# Patient Record
Sex: MALE | Employment: OTHER
[De-identification: names, ages, dates, MRNs, and addresses within clinical notes are randomized per-mention and may not be internally consistent; named-entity substitution may affect disease eponyms.]

---

## 2022-07-30 ENCOUNTER — TELEPHONE ENCOUNTER (OUTPATIENT)
Age: 77
End: 2022-07-30

## 2022-07-30 RX ORDER — OMEPRAZOLE 20 MG/1
1 (ONE) TABLET, DELAYED RELEASE ORAL DAILY
Qty: 0 | Refills: 2 | OUTPATIENT
Start: 2016-02-03 | End: 2016-11-08

## 2022-07-31 ENCOUNTER — TELEPHONE ENCOUNTER (OUTPATIENT)
Age: 77
End: 2022-07-31

## 2022-07-31 RX ORDER — OMEPRAZOLE 40 MG/1
1 (ONE) CAPSULE, DELAYED RELEASE ORAL
Qty: 0 | Refills: 4 | Status: ACTIVE | COMMUNITY
Start: 2016-11-08

## 2022-07-31 RX ORDER — SUCRALFATE 1 G/1
1 (ONE) TABLET ORAL
Qty: 0 | Refills: 4 | Status: ACTIVE | COMMUNITY
Start: 2017-04-06

## 2022-07-31 RX ORDER — OMEPRAZOLE 40 MG/1
1 (ONE) CAPSULE, DELAYED RELEASE ORAL
Qty: 0 | Refills: 2 | Status: ACTIVE | COMMUNITY
Start: 2017-02-01

## 2022-07-31 RX ORDER — OMEPRAZOLE 20 MG/1
1 (ONE) TABLET, DELAYED RELEASE ORAL DAILY
Qty: 0 | Refills: 2 | Status: ACTIVE | COMMUNITY
Start: 2015-03-17

## 2022-07-31 RX ORDER — OMEPRAZOLE 20 MG/1
1 (ONE) TABLET, DELAYED RELEASE ORAL DAILY
Qty: 0 | Refills: 2 | Status: ACTIVE | COMMUNITY
Start: 2014-05-05

## 2022-07-31 RX ORDER — OMEPRAZOLE 20 MG/1
1 (ONE) TABLET, DELAYED RELEASE ORAL DAILY
Qty: 0 | Refills: 16 | Status: ACTIVE | COMMUNITY
Start: 2014-04-21

## 2022-07-31 RX ORDER — OMEPRAZOLE 40 MG/1
1 (ONE) CAPSULE DR CAPSULE, DELAYED RELEASE ORAL
Qty: 0 | Refills: 2 | Status: ACTIVE | COMMUNITY
Start: 2017-03-17

## 2022-07-31 RX ORDER — ACETAMINOPHEN 160 MG/5ML
10 LIQUID ORAL
Qty: 0 | Refills: 4 | Status: ACTIVE | COMMUNITY
Start: 2017-04-06

## 2022-07-31 RX ORDER — OMEPRAZOLE 40 MG/1
1 (ONE) CAPSULE DR CAPSULE, DELAYED RELEASE ORAL
Qty: 0 | Refills: 2 | Status: ACTIVE | COMMUNITY
Start: 2017-02-22

## 2022-07-31 RX ORDER — FAMOTIDINE 10 MG
1 (ONE) TABLET ORAL
Qty: 0 | Refills: 4 | Status: ACTIVE | COMMUNITY
Start: 2017-04-06

## 2022-07-31 RX ORDER — OMEPRAZOLE 20 MG/1
1 (ONE) TABLET, DELAYED RELEASE ORAL DAILY
Qty: 0 | Refills: 2 | Status: ACTIVE | COMMUNITY
Start: 2016-02-03

## 2024-04-25 ENCOUNTER — OV NP (OUTPATIENT)
Dept: URBAN - METROPOLITAN AREA CLINIC 60 | Facility: CLINIC | Age: 79
End: 2024-04-25
Payer: MEDICARE

## 2024-04-25 VITALS
HEART RATE: 62 BPM | BODY MASS INDEX: 27.5 KG/M2 | HEIGHT: 72 IN | SYSTOLIC BLOOD PRESSURE: 138 MMHG | OXYGEN SATURATION: 98 % | TEMPERATURE: 98.6 F | WEIGHT: 203 LBS | DIASTOLIC BLOOD PRESSURE: 86 MMHG

## 2024-04-25 DIAGNOSIS — K21.9 CHRONIC GERD: ICD-10-CM

## 2024-04-25 DIAGNOSIS — K22.710 BARRETT'S ESOPHAGUS WITH LOW GRADE DYSPLASIA: ICD-10-CM

## 2024-04-25 DIAGNOSIS — Z53.20 COLONOSCOPY REFUSED: ICD-10-CM

## 2024-04-25 PROCEDURE — 99204 OFFICE O/P NEW MOD 45 MIN: CPT

## 2024-04-25 RX ORDER — LANCETS 28 GAUGE
EACH MISCELLANEOUS
Qty: 100 UNSPECIFIED | Status: ACTIVE | COMMUNITY

## 2024-04-25 RX ORDER — METFORMIN HYDROCHLORIDE 500 MG/1
TAKE 1 TABLET BY MOUTH TWICE DAILY WITH MEALS TABLET ORAL
Qty: 180 EACH | Refills: 1 | Status: ACTIVE | COMMUNITY

## 2024-04-25 RX ORDER — MAGNESIUM HYDROXIDE 2400 MG/10ML
5 ML SUSPENSION ORAL TWICE A DAY
Status: ACTIVE | COMMUNITY

## 2024-04-25 RX ORDER — PANTOPRAZOLE SODIUM 40 MG/1
1 TABLET TABLET, DELAYED RELEASE ORAL ONCE A DAY
Status: ACTIVE | COMMUNITY

## 2024-04-25 RX ORDER — LATANOPROST 50 UG/ML
SOLUTION OPHTHALMIC
Qty: 7 UNSPECIFIED | Status: ACTIVE | COMMUNITY

## 2024-04-25 RX ORDER — DOCUSATE SODIUM 100 MG
1 CAPSULE AS NEEDED CAPSULE ORAL ONCE A DAY
Status: ACTIVE | COMMUNITY

## 2024-04-25 RX ORDER — SUCRALFATE 1 G/1
TABLET ORAL
Qty: 360 TABLET | Status: ACTIVE | COMMUNITY

## 2024-04-25 NOTE — HPI-TODAY'S VISIT:
Don is a 78-year-old male presenting to the office today for evaluation of history of Reardon's esophagus, history of esophagitis, GERD.  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  He was following with [facility] here in 2017 and had multiple ablations completed and ended up with an esophageal perforation after ablation and was in the ICU for 5 weeks.  Then, he has been following with [facility] since 2017.  Apparently his most recent EGD with [facility] showed no more Reardon's esophagus.  We do not have the most recent EGD but we do have an EGD from 2022 and results are discussed in detail below.  He states that in March he was in [place] and stopped taking his medication including sucralfate and pantoprazole and was having a lot of spicy food and this caused a bout of esophagitis and dysphagia.  He did have EGD completed there that showed esophagitis but Reardon's esophagus could not be adequately examined.  He states since starting back on his pantoprazole and super fate he has been under very good control and has had minimal to no reflux at all.  He also states that his most recent colonoscopy was 1.5-2 years ago and he did not have satisfactory bowel preparation but apparently no polyps were found.  He has no other GI complaints, questions, concerns at this time.  He denies melena, hematochezia, bright red blood per rectum, nausea/vomiting, hematemesis, dysphagia, abdominal pain, constipation/diarrhea/change in bowel habits, change in stool caliber, unintentional weight loss/weight gain, no longer having reflux. . EGD 2/13/2024 completed by Dr. Marko Richards at [facility] in [place];- Z-line irregular, 40 cm from the incisors- Reardon's not adequately examined- Benign-appearing esophageal stenosis-this appears to be secondary to esophageal ablation.  No dilation performed due to inflammation and friability.- No foreign body within the esophagus, however, upon entering the stomach there was a piece of food that appears to have been lodged in the esophagus and cleared spontaneously just prior to endoscopy.- Esophagitis diffusely throughout the distal esophagus likely secondary to retained foreign body- No gross lesions in the stomach- No gross lesions in the entire examined duodenum- No specimens collected. . EGD 3/2/2022;- Esophageal mucosal changes secondary to established long segment Reardon's disease classified as Reardon's stage C 0-M4 per Rogers criteria.  Treated with argon plasma coagulation.- The examination was otherwise normal- Mucosal resection was performed.  Only 1 band would adhere to permanent resection of just 1 small area. . EGD 8/31/2021;- Benign-appearing esophageal stenosis.  Dilated.- Esophageal mucosal changes secondary to established long segment Reardon's disease classified as Reardon's stage C 0-M1 7 per Rogers criteria.  Biopsied.  Treated with radiofrequency ablation.- Small hiatal hernia.- Single duodenal polyp.  Biopsied.- The examination was otherwise normal. . EGD 7/30/2017 completed by Dr. Cristofer Dumont; - Reardon's esophagus - Benign-appearing esophageal stenosis.  Dilated. - Large hiatal hernia - Normal examined duodenum - No specimens collected . EGD 6/14/2017 completed by Dr. Cristofer Dumont; - Benign-appearing esophageal stenosis.  Dilated. - Esophageal mucosal changes classified as Reardon's stage C 12-M15 per Rogers criteria.  There were 2 stenoses at 28 cm and 25 cm so RFA ablation was not done today.  Rather the stenoses were dilated. - Medium sized hiatal hernia- Normal stomach - Normal examined duodenum - No specimens collected . EGD 4/19/2017 completed by Dr. Cristofer Dumont; - Esophageal mucosal changes secondary to established long segment Reardon's esophagus disease, classified as Reardon's stage C 17-M17 per Rogers criteria.  Treated with radiofrequency ablation on [facility] ED no nodules or concerning features seen - No specimens collected . EGD 1/30/2017 completed by Dr. Refugio Jarquin; - Esophageal mucosal changes secondary to established long segment Reardon's disease.  Biopsy. - Normal stomach. - Normal examined duodenum. - Pathology; - Esophagus 40 cm biopsy; reactive gastric-type mucosa with chronic inflammation.  Negative for intestinal metaplasia. 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 - Esophagus 39 cm, 38 cm, 37 cm, 36 cm, 35 cm, 33 cm, 32 cm, 31 cm, 30 cm, 29 cm, 28 cm, 27 cm, 26 cm, 25 cm, 24 cm, 23 cm all showing columnar mucosa with intestinal metaplasia.  Negative for dysplasia . CT abdomen/pelvis 9/26/2023; - Impression; 1.  Mild hydroureter bilaterally with periureteral stranding on the right.  No nephrolithiasis or obstructing ureteral stone.  Findings may be related to recently passed stone versus infectious etiology. 2.  Stable retroperitoneal lymph node measuring 1.6 cm short axis.3.  Fluid-filled distal esophagus. . 3/20/2024 labs; - CMP; glucose 139, calcium 11.0, otherwise within normal limits - CBC within normal limits - Iron studies within normal limits - Hemoglobin A1c within normal limits

## 2024-05-02 ENCOUNTER — LAB OUTSIDE AN ENCOUNTER (OUTPATIENT)
Dept: URBAN - METROPOLITAN AREA CLINIC 60 | Facility: CLINIC | Age: 79
End: 2024-05-02

## 2024-07-18 ENCOUNTER — TELEPHONE ENCOUNTER (OUTPATIENT)
Dept: URBAN - METROPOLITAN AREA CLINIC 60 | Facility: CLINIC | Age: 79
End: 2024-07-18

## 2024-08-09 ENCOUNTER — DASHBOARD ENCOUNTERS (OUTPATIENT)
Age: 79
End: 2024-08-09

## 2024-08-09 ENCOUNTER — OFFICE VISIT (OUTPATIENT)
Dept: URBAN - METROPOLITAN AREA CLINIC 60 | Facility: CLINIC | Age: 79
End: 2024-08-09
Payer: MEDICARE

## 2024-08-09 VITALS
OXYGEN SATURATION: 96 % | HEIGHT: 72 IN | SYSTOLIC BLOOD PRESSURE: 136 MMHG | WEIGHT: 210.8 LBS | HEART RATE: 71 BPM | TEMPERATURE: 97.6 F | BODY MASS INDEX: 28.55 KG/M2 | DIASTOLIC BLOOD PRESSURE: 74 MMHG

## 2024-08-09 DIAGNOSIS — Z53.20 COLONOSCOPY REFUSED: ICD-10-CM

## 2024-08-09 DIAGNOSIS — Z87.19 HISTORY OF BARRETT'S ESOPHAGUS: ICD-10-CM

## 2024-08-09 PROCEDURE — 99213 OFFICE O/P EST LOW 20 MIN: CPT

## 2024-08-09 RX ORDER — METFORMIN HYDROCHLORIDE 500 MG/1
TAKE 1 TABLET BY MOUTH TWICE DAILY WITH MEALS TABLET ORAL
Qty: 180 EACH | Refills: 1 | Status: ACTIVE | COMMUNITY

## 2024-08-09 RX ORDER — PANTOPRAZOLE SODIUM 40 MG/1
1 TABLET TABLET, DELAYED RELEASE ORAL ONCE A DAY
Status: ACTIVE | COMMUNITY

## 2024-08-09 RX ORDER — LATANOPROST 50 UG/ML
SOLUTION OPHTHALMIC
Qty: 7 UNSPECIFIED | Status: ACTIVE | COMMUNITY

## 2024-08-09 RX ORDER — DOCUSATE SODIUM 100 MG
1 CAPSULE AS NEEDED CAPSULE ORAL ONCE A DAY
Status: ACTIVE | COMMUNITY

## 2024-08-09 RX ORDER — SUCRALFATE 1 G/1
TABLET ORAL
Qty: 360 TABLET | Status: ACTIVE | COMMUNITY

## 2024-08-09 RX ORDER — MAGNESIUM HYDROXIDE 2400 MG/10ML
5 ML SUSPENSION ORAL TWICE A DAY
Status: ACTIVE | COMMUNITY

## 2024-08-09 NOTE — HPI-TODAY'S VISIT:
Patient is a 79-year-old male with a past medical history of Reardon's esophagus who presents today for EGD evaluation.  Patient has a significant history of long segment Reardon's esophagus and has had multiple EGDs for monitoring along with a radiofrequency ablation for treatment in 2017.  Unfortunately the ablation ended with an esophageal perforation and patient spent 5 weeks in ICU.  He then started following with [facility] and has had EGDs with them.  At today's visit he states that he would like to establish with a GI closer to home instead of continuing to follow with [facility].  At today's visit he states that he is doing well and has no new GI symptoms or complaints.  He continues to take pantoprazole daily with good acid reflux control.  His last EGD was in 2024 however Reardon's esophagus was not adequately examined at that time.  Patient also states that his last colonoscopy was about 2 years ago and he was told he did not have satisfactory bowel prep however there were no polyps found at that time.  Patient denies fever, dysphagia, acid reflux, change in appetite, abdominal pain, nausea, vomiting, diarrhea, constipation, hematemesis, hematochezia, melena, change in stool frequency/caliber and unintentional weight loss/gain.  . EGD 2/13/2024 completed by Dr. Marko Richards at [facility] in [place]; - Z-line irregular, 40 cm from the incisors - Reardon's not adequately examined - Benign-appearing esophageal stenosis-this appears to be secondary to esophageal ablation.  No dilation performed due to inflammation and friability. - No foreign body within the esophagus, however, upon entering the stomach there was a piece of food that appears to have been lodged in the esophagus and cleared spontaneously just prior to endoscopy. - Esophagitis diffusely throughout the distal esophagus likely secondary to retained foreign body - No gross lesions in the stomach - No gross lesions in the entire examined duodenum - No specimens collected. . EGD 3/2/2022; - Esophageal mucosal changes secondary to established long segment Reardon's disease classified as Reardon's stage C 0-M4 per Narvon criteria.  Treated with argon plasma coagulation. - The examination was otherwise normal- Mucosal resection was performed.  Only 1 band would adhere to permanent resection of just 1 small area. . EGD 8/31/2021; - Benign-appearing esophageal stenosis.  Dilated. - Esophageal mucosal changes secondary to established long segment Reardon's disease classified as Reardon's stage C 0-M1 7 per Narvon criteria.  Biopsied.  Treated with radiofrequency ablation. - Small hiatal hernia. - Single duodenal polyp.  Biopsied. - The examination was otherwise normal. . EGD 7/30/2017 completed by Dr. Cristofer Dumont; - Reardon's esophagus - Benign-appearing esophageal stenosis.  Dilated. - Large hiatal hernia - Normal examined duodenum - No specimens collected . EGD 6/14/2017 completed by Dr. Cristofer Dumont; - Benign-appearing esophageal stenosis.  Dilated. - Esophageal mucosal changes classified as Reardon's stage C 12-M15 per Narvon criteria.  There were 2 stenoses at 28 cm and 25 cm so RFA ablation was not done today.  Rather the stenoses were dilated. - Medium sized hiatal hernia- Normal stomach - Normal examined duodenum - No specimens collected . EGD 4/19/2017 completed by Dr. Cristofer Dumont; - Esophageal mucosal changes secondary to established long segment Reardon's esophagus disease, classified as Reardon's stage C 17-M17 per Narvon criteria.  Treated with radiofrequency ablation on [facility] ED no nodules or concerning features seen - No specimens collected . EGD 1/30/2017 completed by Dr. Refugio Jarquin; - Esophageal mucosal changes secondary to established long segment Reardon's disease.  Biopsy. - Normal stomach. - Normal examined duodenum. - Pathology; - Esophagus 40 cm biopsy; reactive gastric-type mucosa with chronic inflammation.  Negative for intestinal metaplasia. 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 - Esophagus 39 cm, 38 cm, 37 cm, 36 cm, 35 cm, 33 cm, 32 cm, 31 cm, 30 cm, 29 cm, 28 cm, 27 cm, 26 cm, 25 cm, 24 cm, 23 cm all showing columnar mucosa with intestinal metaplasia.  Negative for dysplasia . CT abdomen/pelvis 9/26/2023; - Impression; 1.  Mild hydroureter bilaterally with periureteral stranding on the right.  No nephrolithiasis or obstructing ureteral stone.  Findings may be related to recently passed stone versus infectious etiology. 2.  Stable retroperitoneal lymph node measuring 1.6 cm short axis.3.  Fluid-filled distal esophagus. . Labs 6/12/2024 - CMP within normal limits - Lipid panel: HDL 44, triglyceride 183 otherwise unremarkable - PSA within normal limits - CBCD within normal limits - Hemoglobin A1c within normal limits - Thyroid panel within normal limits . Labs 3/20/2024 - CMP: Glucose 139, calcium 11.0 otherwise unremarkable - Iron panel within normal limits - Vitamin B12 within normal limits - Folate greater than 20 - CBCD within normal limits - Hemoglobin A1c 5.7

## 2024-08-16 ENCOUNTER — LAB OUTSIDE AN ENCOUNTER (OUTPATIENT)
Dept: URBAN - METROPOLITAN AREA CLINIC 60 | Facility: CLINIC | Age: 79
End: 2024-08-16